# Patient Record
Sex: FEMALE | Race: WHITE | NOT HISPANIC OR LATINO | URBAN - METROPOLITAN AREA
[De-identification: names, ages, dates, MRNs, and addresses within clinical notes are randomized per-mention and may not be internally consistent; named-entity substitution may affect disease eponyms.]

---

## 2022-12-22 ENCOUNTER — APPOINTMENT (RX ONLY)
Dept: URBAN - METROPOLITAN AREA CLINIC 148 | Facility: CLINIC | Age: 57
Setting detail: DERMATOLOGY
End: 2022-12-22

## 2022-12-22 DIAGNOSIS — L98.8 OTHER SPECIFIED DISORDERS OF THE SKIN AND SUBCUTANEOUS TISSUE: ICD-10-CM

## 2022-12-22 PROCEDURE — ? COSMETIC CONSULTATION: BOTULINUM TOXIN

## 2022-12-22 PROCEDURE — ? BOTOX (U OR CC)

## 2022-12-22 NOTE — PROCEDURE: BOTOX (U OR CC)
Post-Care Instructions: Post care instructions reviewed with patient today and all questions answered to patient's satisfaction. Patient provided ice pack and copy of instructions. Patient verbalizes understanding of post care instructions.

## 2022-12-22 NOTE — PROCEDURE: BOTOX (U OR CC)
Consent: Written consent obtained. Risks include but not limited to lid/brow ptosis, bruising, swelling, diplopia, temporary effect, incomplete chemical denervation. Areas cleansed with alcohol prior to injection. Areas treated without complication, patient tolerated well.